# Patient Record
Sex: MALE | Race: BLACK OR AFRICAN AMERICAN
[De-identification: names, ages, dates, MRNs, and addresses within clinical notes are randomized per-mention and may not be internally consistent; named-entity substitution may affect disease eponyms.]

---

## 2021-01-26 ENCOUNTER — HOSPITAL ENCOUNTER (EMERGENCY)
Dept: HOSPITAL 62 - ER | Age: 41
Discharge: HOME | End: 2021-01-26
Payer: COMMERCIAL

## 2021-01-26 VITALS — SYSTOLIC BLOOD PRESSURE: 122 MMHG | DIASTOLIC BLOOD PRESSURE: 84 MMHG

## 2021-01-26 DIAGNOSIS — R07.89: Primary | ICD-10-CM

## 2021-01-26 DIAGNOSIS — M47.9: ICD-10-CM

## 2021-01-26 DIAGNOSIS — I10: ICD-10-CM

## 2021-01-26 DIAGNOSIS — Z79.899: ICD-10-CM

## 2021-01-26 DIAGNOSIS — Z91.018: ICD-10-CM

## 2021-01-26 DIAGNOSIS — G43.909: ICD-10-CM

## 2021-01-26 DIAGNOSIS — Z82.49: ICD-10-CM

## 2021-01-26 DIAGNOSIS — M17.11: ICD-10-CM

## 2021-01-26 LAB
ADD MANUAL DIFF: NO
ALBUMIN SERPL-MCNC: 4.3 G/DL (ref 3.5–5)
ALP SERPL-CCNC: 66 U/L (ref 38–126)
ANION GAP SERPL CALC-SCNC: 6 MMOL/L (ref 5–19)
AST SERPL-CCNC: 40 U/L (ref 17–59)
BASOPHILS # BLD AUTO: 0 10^3/UL (ref 0–0.2)
BASOPHILS NFR BLD AUTO: 0.7 % (ref 0–2)
BILIRUB DIRECT SERPL-MCNC: 0.2 MG/DL (ref 0–0.4)
BILIRUB SERPL-MCNC: 0.5 MG/DL (ref 0.2–1.3)
BUN SERPL-MCNC: 19 MG/DL (ref 7–20)
CALCIUM: 9.7 MG/DL (ref 8.4–10.2)
CHLORIDE SERPL-SCNC: 102 MMOL/L (ref 98–107)
CK MB SERPL-MCNC: 2.53 NG/ML (ref ?–4.55)
CK SERPL-CCNC: 376 U/L (ref 55–170)
CO2 SERPL-SCNC: 29 MMOL/L (ref 22–30)
EOSINOPHIL # BLD AUTO: 0.3 10^3/UL (ref 0–0.6)
EOSINOPHIL NFR BLD AUTO: 6 % (ref 0–6)
ERYTHROCYTE [DISTWIDTH] IN BLOOD BY AUTOMATED COUNT: 14.3 % (ref 11.5–14)
GLUCOSE SERPL-MCNC: 115 MG/DL (ref 75–110)
HCT VFR BLD CALC: 43.4 % (ref 37.9–51)
HGB BLD-MCNC: 13.5 G/DL (ref 13.5–17)
LYMPHOCYTES # BLD AUTO: 1.4 10^3/UL (ref 0.5–4.7)
LYMPHOCYTES NFR BLD AUTO: 30.1 % (ref 13–45)
MCH RBC QN AUTO: 24.3 PG (ref 27–33.4)
MCHC RBC AUTO-ENTMCNC: 31.2 G/DL (ref 32–36)
MCV RBC AUTO: 78 FL (ref 80–97)
MONOCYTES # BLD AUTO: 0.3 10^3/UL (ref 0.1–1.4)
MONOCYTES NFR BLD AUTO: 6.6 % (ref 3–13)
NEUTROPHILS # BLD AUTO: 2.6 10^3/UL (ref 1.7–8.2)
NEUTS SEG NFR BLD AUTO: 56.6 % (ref 42–78)
PLATELET # BLD: 173 10^3/UL (ref 150–450)
POTASSIUM SERPL-SCNC: 4.2 MMOL/L (ref 3.6–5)
PROT SERPL-MCNC: 7.4 G/DL (ref 6.3–8.2)
RBC # BLD AUTO: 5.57 10^6/UL (ref 4.35–5.55)
TOTAL CELLS COUNTED % (AUTO): 100 %
TROPONIN I SERPL-MCNC: < 0.012 NG/ML
WBC # BLD AUTO: 4.6 10^3/UL (ref 4–10.5)

## 2021-01-26 PROCEDURE — 93010 ELECTROCARDIOGRAM REPORT: CPT

## 2021-01-26 PROCEDURE — 85379 FIBRIN DEGRADATION QUANT: CPT

## 2021-01-26 PROCEDURE — 82553 CREATINE MB FRACTION: CPT

## 2021-01-26 PROCEDURE — 99285 EMERGENCY DEPT VISIT HI MDM: CPT

## 2021-01-26 PROCEDURE — 36415 COLL VENOUS BLD VENIPUNCTURE: CPT

## 2021-01-26 PROCEDURE — 80053 COMPREHEN METABOLIC PANEL: CPT

## 2021-01-26 PROCEDURE — 84484 ASSAY OF TROPONIN QUANT: CPT

## 2021-01-26 PROCEDURE — 96374 THER/PROPH/DIAG INJ IV PUSH: CPT

## 2021-01-26 PROCEDURE — 71045 X-RAY EXAM CHEST 1 VIEW: CPT

## 2021-01-26 PROCEDURE — 85025 COMPLETE CBC W/AUTO DIFF WBC: CPT

## 2021-01-26 PROCEDURE — 82550 ASSAY OF CK (CPK): CPT

## 2021-01-26 PROCEDURE — 93005 ELECTROCARDIOGRAM TRACING: CPT

## 2021-01-26 NOTE — RADIOLOGY REPORT (SQ)
EXAM DESCRIPTION:  CHEST SINGLE VIEW



IMAGES COMPLETED DATE/TIME:  1/26/2021 10:14 am



REASON FOR STUDY:  bed 10 chest pain



COMPARISON:  None.



EXAM PARAMETERS:  NUMBER OF VIEWS: One view.

TECHNIQUE: Single frontal radiographic view of the chest acquired.

RADIATION DOSE: NA

LIMITATIONS: None.



FINDINGS:  LUNGS AND PLEURA: No opacities, masses or pneumothorax. No pleural effusion.

MEDIASTINUM AND HILAR STRUCTURES: No masses.  Contour normal.

HEART AND VASCULAR STRUCTURES: Heart normal in size.  Normal vasculature.

BONES: No acute findings.

HARDWARE: None in the chest.

OTHER: No other significant finding.



IMPRESSION:  NO ACUTE RADIOGRAPHIC FINDING IN THE CHEST.



TECHNICAL DOCUMENTATION:  JOB ID:  [id number]

 2011 [employer]- All Rights Reserved



Reading location - IP/workstation name: [email or web address]

## 2021-01-26 NOTE — ER DOCUMENT REPORT
ED General





- General


Chief Complaint: Chest Pain


Stated Complaint: CHEST PAIN


Time Seen by Provider: 01/26/21 09:46





- HPI


Notes: 





Chief complaint: Chest pain





History of present illness: 40-year-old male presenting for evaluation of chest 

pain.  Patient says he awakened this morning with sharp intermittent discomfort 

in area of his left nipple which radiates to left shoulder and left arm.  This 

comes and goes and is not clearly related to movement or exertion.  Episodes 

last about 10 seconds at a time.  He denies any unaccustomed activity or injury.

 Patient notes that he has made a drive of several 100 miles within the last 24 

hours.  He denies any known history of thromboembolic disease.  He denies any 

known history of coronary disease.  He denies hemoptysis or shortness of breath.

 He denies any pain or swelling in his lower legs.  He denies any nausea or 

vomiting.





Positive CAD risk factors: 


Family history is positive for CAD.  


He has a history of hypertension under treatment for the last 10 years.





Negative CAD risk factors:


Non-smoker


No history of hyperlipidemia


No history of diabetes mellitus


No history of obesity





HEART Score:





HISTORY 1


      


ECG 0


      


AGE 1


      


RISK FACTORS 1


   


TROPONIN 0   





TOTAL: 2





 If HEART score is < 3 AND both tronponin measurments are normal, the 30 day 

risk of a major adverse cardiac event (all-cause mortality, myocardia infarction

or need for coronary revscularization) is < 1% (Sensitivity 100%, %).








Current medications:





Propranolol 80 mg twice daily





Butalbital/codeine/acetaminophen as needed migraine headaches





- Related Data


Allergies/Adverse Reactions: 


                                        





banana Allergy (Verified 01/26/21 10:05)


   








Home Medications: bp medication





Past Medical History





- General


Information source: Patient





- Social History


Smoking Status: Never Smoker


Chew tobacco use (# tins/day): No


Frequency of alcohol use: None


Drug Abuse: None


Occupation: Industrial electrician


Family History: CAD


Patient has homicidal ideation: No





- Past Medical History


Cardiac Medical History: Reports: Hx Hypertension


   Denies: Hx Coronary Artery Disease, Hx DVT, Hx Pulmonary Embolism


Neurological Medical History: Reports: Hx Migraine


Endocrine Medical History: Denies: Hx Diabetes Mellitus Type 1, Hx Diabetes 

Mellitus Type 2


Musculoskeletal Medical History: Reports Hx Arthritis - Right knee





Review of Systems





- Review of Systems


Notes: 





Constitutional: Negative for fever.


HENT: Negative for sore throat.


Eyes: Negative for visual changes.


Cardiovascular: As per HPI.


Respiratory: Negative for shortness of breath.


Gastrointestinal: Negative for abdominal pain, vomiting or diarrhea.


Genitourinary: Negative for dysuria.


Musculoskeletal: Chronic arthritic pain right knee and lower back.


Skin: Negative for rash.


Neurological: Negative for headaches, weakness or numbness.





10 point ROS negative except as marked above and in HPI.








Physical Exam





- Vital signs


Vitals: 


                                        











Resp


 


 19 


 


 01/26/21 09:45








/105


Interpretation: Hypertensive





- Notes


Notes: 











GENERAL: Middle-age male appearing in no acute distress.





SKIN: Good turgor no rashes.





HEAD: Normocephalic atraumatic.





EYES: PERRLA.  EOMI.  Conjunctivae and sclerae clear.





EARS: CANALS AND TMS CLEAR.





NOSE: CLEAR.





MOUTH: Moist mucosa.  Good dentition.  No stridor or edema.  No drooling.





NECK: Supple.  No masses or thyromegaly.  No adenopathy.  Carotids 2+ without 

bruits.  No JVD.





BACK: Symmetrical without tenderness.





CHEST: Respirations unlabored.  Breath sounds clear and symmetrical.





HEART: Regular rhythm.  No murmur gallop or rub.





ABDOMEN: Soft nontender without masses, organomegaly or rebound.  Bowel sounds 

normally active.  No bruits.





GENITALIA: Deferred.





EXTREMITIES: No edema.  No calf tenderness.  Cap refill less than 1.5 seconds.  

Dorsalis pedis and posterior tibial pulses 3+ and symmetrical.





NEUROLOGICAL: GCS 15.  Alert and oriented x3.  Fluent speech.  Cranial nerves II

through XII intact.  Sensorimotor and cerebellar normal.  Normal tone.





PSYCHIATRIC: Slightly anxious affect.





Course





- Re-evaluation


Re-evalutation: 





01/26/21 13:31


Patient presented with atypical chest pain.  Heart score is 2.  He has had 2 - 

EKGs and 2 troponins normal 3 hours apart.  His D-dimer was normal.  His chest 

x-ray is normal.  I think his chest pain is likely musculoskeletal origin but he

does have several cardiac risk factors.  Case was discussed with on-call 

cardiologist Dr. Gan who will see the patient back for follow-up in 

anticipation of an outpatient treadmill test.  I have given patient a 3-day work

note.





- Vital Signs


Vital signs: 


                                        











Temp Pulse Resp BP Pulse Ox


 


       13   142/105 H  98 


 


       01/26/21 10:01  01/26/21 10:01  01/26/21 10:01














- Laboratory Results


Result Diagrams: 


                                 01/26/21 09:58





                                 01/26/21 09:58


Laboratory Results Interpreted: 


                                        











  01/26/21 01/26/21





  09:58 09:58


 


RBC  5.57 H 


 


MCV  78 L 


 


MCH  24.3 L 


 


MCHC  31.2 L 


 


RDW  14.3 H 


 


Sodium   136.9 L


 


Glucose   115 H


 


ALT   59 H


 


Creatine Kinase   376 H











Critical Laboratory Results Reviewed: No Critical Results





- Radiology Results


Radiology Results Interpreted: 





01/26/21 10:50





                                        





Chest X-Ray  01/26/21 09:45


IMPRESSION:  NO ACUTE RADIOGRAPHIC FINDING IN THE CHEST.


 











Critical Radiology Results Reviewed: No Critical Results





- EKG Interpretation by Me


Additional EKG results interpreted by me: 





01/26/21 10:50


Twelve-lead EKG reviewed by me contemporaneously: 0942 hrs.


Indication for study: Chest pain


Rhythm: Normal sinus


Rate: 75


Intervals: Normal intervals


QRS axis: +6 degrees


ST/T wave changes: None


Comparison with prior tracing: None





Interpretation: Normal sinus rhythm





Discharge





- Discharge


Clinical Impression: 


Chest pain


Qualifiers:


 Chest pain type: unspecified Qualified Code(s): R07.9 - Chest pain, unspecified





Condition: Stable


Disposition: HOME, SELF-CARE


Instructions:  Chest Pain of Unclear Cause ([facility])


Additional Instructions: 


Take prescribed medication as directed.





You have been provided a work note for the next 3 days.





You will be contacted by on-call cardiologist to schedule office visit and 

outpatient treadmill test.





Return here as needed for new or worsening symptoms:





Pain that is worsening or unimproved


Uncontrolled vomiting


High fever or shaking chills


Overall worsening


Prescriptions: 


Ketorolac Tromethamine [Toradol 10 mg Tablet] 10 mg PO Q6HP PRN 10 Days #20 

tablet


 PRN Reason: 


Forms:  Parent Work Note


Referrals: 


VINNIE GAN MD [ACTIVE PROVISIONAL STAFF] - Follow up as needed